# Patient Record
Sex: MALE | Race: WHITE
[De-identification: names, ages, dates, MRNs, and addresses within clinical notes are randomized per-mention and may not be internally consistent; named-entity substitution may affect disease eponyms.]

---

## 2021-12-09 ENCOUNTER — DASHBOARD ENCOUNTERS (OUTPATIENT)
Age: 46
End: 2021-12-09

## 2021-12-09 ENCOUNTER — LAB OUTSIDE AN ENCOUNTER (OUTPATIENT)
Dept: URBAN - NONMETROPOLITAN AREA CLINIC 4 | Facility: CLINIC | Age: 46
End: 2021-12-09

## 2021-12-09 ENCOUNTER — OFFICE VISIT (OUTPATIENT)
Dept: URBAN - NONMETROPOLITAN AREA CLINIC 4 | Facility: CLINIC | Age: 46
End: 2021-12-09
Payer: COMMERCIAL

## 2021-12-09 ENCOUNTER — WEB ENCOUNTER (OUTPATIENT)
Dept: URBAN - NONMETROPOLITAN AREA CLINIC 4 | Facility: CLINIC | Age: 46
End: 2021-12-09

## 2021-12-09 VITALS
BODY MASS INDEX: 38.36 KG/M2 | DIASTOLIC BLOOD PRESSURE: 74 MMHG | HEART RATE: 64 BPM | WEIGHT: 315 LBS | TEMPERATURE: 97.3 F | SYSTOLIC BLOOD PRESSURE: 115 MMHG | HEIGHT: 76 IN

## 2021-12-09 DIAGNOSIS — Z12.11 SCREENING FOR COLON CANCER: ICD-10-CM

## 2021-12-09 PROCEDURE — 99203 OFFICE O/P NEW LOW 30 MIN: CPT | Performed by: REGISTERED NURSE

## 2021-12-09 RX ORDER — SODIUM, POTASSIUM,MAG SULFATES 17.5-3.13G
177 ML SOLUTION, RECONSTITUTED, ORAL ORAL
Qty: 1 BOTTLE | Refills: 0 | OUTPATIENT
Start: 2021-12-09 | End: 2021-12-10

## 2021-12-09 RX ORDER — DICLOFENAC 35 MG/1
1 CAPSULE AS NEEDED CAPSULE ORAL THREE TIMES A DAY
Status: ACTIVE | COMMUNITY

## 2021-12-09 RX ORDER — CHLORTHALIDONE 50 MG/1
1 TABLET IN THE MORNING WITH FOOD TABLET ORAL ONCE A DAY
Status: ACTIVE | COMMUNITY

## 2021-12-09 NOTE — HPI-TODAY'S VISIT:
12/9/21: Pt is a 47 yo male with PMH of HTN and arthritis who presents on self referral for screening colonosocpy.   Patient has never had a cscope. There is no family history of colon polyps or cancer. Patient denies change in bowel habits, appetite and weight. Patient denies bleeding per rectum. Not on any blood thinners.

## 2021-12-14 ENCOUNTER — TELEPHONE ENCOUNTER (OUTPATIENT)
Dept: URBAN - METROPOLITAN AREA SURGERY CENTER 30 | Facility: SURGERY CENTER | Age: 46
End: 2021-12-14

## 2021-12-16 ENCOUNTER — OFFICE VISIT (OUTPATIENT)
Dept: URBAN - METROPOLITAN AREA MEDICAL CENTER 23 | Facility: MEDICAL CENTER | Age: 46
End: 2021-12-16
Payer: COMMERCIAL

## 2021-12-16 DIAGNOSIS — K63.5 BENIGN COLON POLYP: ICD-10-CM

## 2021-12-16 PROCEDURE — 45380 COLONOSCOPY AND BIOPSY: CPT | Performed by: INTERNAL MEDICINE

## 2021-12-16 RX ORDER — CHLORTHALIDONE 50 MG/1
1 TABLET IN THE MORNING WITH FOOD TABLET ORAL ONCE A DAY
Status: ACTIVE | COMMUNITY

## 2021-12-16 RX ORDER — DICLOFENAC 35 MG/1
1 CAPSULE AS NEEDED CAPSULE ORAL THREE TIMES A DAY
Status: ACTIVE | COMMUNITY

## 2022-01-04 ENCOUNTER — OFFICE VISIT (OUTPATIENT)
Dept: URBAN - NONMETROPOLITAN AREA CLINIC 4 | Facility: CLINIC | Age: 47
End: 2022-01-04